# Patient Record
Sex: MALE | Race: WHITE | URBAN - NONMETROPOLITAN AREA
[De-identification: names, ages, dates, MRNs, and addresses within clinical notes are randomized per-mention and may not be internally consistent; named-entity substitution may affect disease eponyms.]

---

## 2024-01-23 ENCOUNTER — HOSPITAL ENCOUNTER (EMERGENCY)
Age: 44
Discharge: HOME | End: 2024-01-23

## 2024-01-23 ENCOUNTER — APPOINTMENT (OUTPATIENT)
Dept: GENERAL RADIOLOGY | Age: 44
End: 2024-01-23

## 2024-01-23 VITALS
BODY MASS INDEX: 36.8 KG/M2 | HEIGHT: 75 IN | TEMPERATURE: 97.8 F | OXYGEN SATURATION: 98 % | SYSTOLIC BLOOD PRESSURE: 139 MMHG | HEART RATE: 73 BPM | DIASTOLIC BLOOD PRESSURE: 93 MMHG | WEIGHT: 296 LBS | RESPIRATION RATE: 16 BRPM

## 2024-01-23 DIAGNOSIS — M25.561 RIGHT KNEE PAIN, UNSPECIFIED CHRONICITY: ICD-10-CM

## 2024-01-23 DIAGNOSIS — H92.03 OTALGIA OF BOTH EARS: Primary | ICD-10-CM

## 2024-01-23 PROCEDURE — 99283 EMERGENCY DEPT VISIT LOW MDM: CPT

## 2024-01-23 PROCEDURE — 73560 X-RAY EXAM OF KNEE 1 OR 2: CPT

## 2024-01-23 RX ORDER — PSEUDOEPHEDRINE HCL 30 MG
30 TABLET ORAL EVERY 6 HOURS PRN
Qty: 20 TABLET | Refills: 0 | Status: SHIPPED | OUTPATIENT
Start: 2024-01-23

## 2024-01-23 RX ORDER — NAPROXEN 500 MG/1
500 TABLET ORAL 2 TIMES DAILY WITH MEALS
Qty: 20 TABLET | Refills: 0 | Status: SHIPPED | OUTPATIENT
Start: 2024-01-23

## 2024-01-23 RX ORDER — FLUTICASONE PROPIONATE 50 MCG
1 SPRAY, SUSPENSION (ML) NASAL DAILY
Qty: 16 G | Refills: 0 | Status: SHIPPED | OUTPATIENT
Start: 2024-01-23

## 2024-01-23 ASSESSMENT — PAIN - FUNCTIONAL ASSESSMENT: PAIN_FUNCTIONAL_ASSESSMENT: 0-10

## 2024-01-23 ASSESSMENT — PAIN SCALES - GENERAL: PAINLEVEL_OUTOF10: 7

## 2024-01-23 ASSESSMENT — LIFESTYLE VARIABLES
HOW OFTEN DO YOU HAVE A DRINK CONTAINING ALCOHOL: NEVER
HOW MANY STANDARD DRINKS CONTAINING ALCOHOL DO YOU HAVE ON A TYPICAL DAY: PATIENT DOES NOT DRINK

## 2024-01-23 NOTE — ED PROVIDER NOTES
CC/HPI Summary, DDx, ED Course, and Reassessment:       Patient presented to the ER for evaluation of earache and knee pain.  States feels like his ears are clogged or swishing sensation on exam there is no cerumen impaction he has very mild cerumen that is on the sides of the ear canal wall.  He does have a small amount of middle ear fluid on the right.  No TM erythema or perforation.  Plan for treatment with Flonase and decongestant.  Second complaint is right knee pain for 4 days mainly to medial and posterior aspect of the knee pain with knee flexion and with bearing weight feels like is going to give out on him.  He is able to hold his leg out extended up off the bed no obvious joint instability on my exam has tenderness medially.  No obvious swelling.  No calf tenderness.  Neurovascular intact.  X-ray of right knee ordered.  He declines Tylenol or ibuprofen here.      Right knee x-rays interpreted by radiologist and reviewed by myself no fracture no dislocation no obvious joint effusion      Discussed results with patient.  He will be placed in a knee immobilizer.  Advise rest ice and elevate.  Naproxen prescribed as needed for pain.  Referral to orthopedics for follow-up.      I estimate there is LOW risk for COMPARTMENT SYNDROME, DEEP VENOUS THROMBOSIS, SEPTIC ARTHRITIS, TENDON OR NEUROVASCULAR INJURY, thus I consider the discharge disposition reasonable.      I am the Primary Clinician of Record.  FINAL IMPRESSION      1. Otalgia of both ears    2. Right knee pain, unspecified chronicity          DISPOSITION/PLAN     DISPOSITION Decision to Discharge    PATIENT REFERRED TO:  Your family doctor    In 1 week      Arnel Guzman DO  [address]  [address]  [facility] [id number] [phone]      [facility] orthopedics as needed if knee pain persists      DISCHARGE MEDICATIONS:  Discharge Medication List as of 1/23/2024  7:15 PM        START taking these medications    Details   fluticasone

## 2024-01-24 NOTE — ED NOTES
Pt discharge instructions, follow up and rx x 3 reviewed with pt. Pt verbalized understanding. No further needs. Pt discharged at this time.

## 2024-01-24 NOTE — DISCHARGE INSTRUCTIONS
Recommend follow-up with your primary care doctor.  Also referred to orthopedics as needed if needed pain persist.  Recommend ice rest and elevate.

## 2024-01-25 ENCOUNTER — HOSPITAL ENCOUNTER (EMERGENCY)
Age: 44
Discharge: HOME | End: 2024-01-25
Attending: EMERGENCY MEDICINE

## 2024-01-25 VITALS
RESPIRATION RATE: 18 BRPM | WEIGHT: 296 LBS | SYSTOLIC BLOOD PRESSURE: 123 MMHG | DIASTOLIC BLOOD PRESSURE: 73 MMHG | HEART RATE: 92 BPM | TEMPERATURE: 98.6 F | BODY MASS INDEX: 36.8 KG/M2 | OXYGEN SATURATION: 97 % | HEIGHT: 75 IN

## 2024-01-25 DIAGNOSIS — I83.899 RUPTURED VARICOSE VEIN: Primary | ICD-10-CM

## 2024-01-25 PROCEDURE — 99283 EMERGENCY DEPT VISIT LOW MDM: CPT

## 2024-01-25 ASSESSMENT — PAIN - FUNCTIONAL ASSESSMENT: PAIN_FUNCTIONAL_ASSESSMENT: 0-10

## 2024-01-25 ASSESSMENT — PAIN SCALES - GENERAL: PAINLEVEL_OUTOF10: 6

## 2024-01-26 NOTE — ED PROVIDER NOTES
follow-up return precautions.    - Patient was given    ED Medication Orders (From admission, onward)      None              - I discussed the results with patient/family including incidental findings.  - At this point I do not see indication for further work-up in the ER, as it is unlikely  and poses more risk than benefit.  - Follow up plan and return precautions also discussed.  Patient/family verbalized understanding of plan and agreeable to plan.        Clinical Impression:  1. Ruptured varicose vein          Disposition:  Discharge to home in good condition.    Blood pressure 123/73, pulse 92, temperature 98.6 °F (37 °C), temperature source Oral, resp. rate 18, height 1.905 m (6' 3\"), weight 134.3 kg (296 lb), SpO2 97 %.    Patient was given scripts for the following medications. I counseled patient how to take these medications.   New Prescriptions    No medications on file     Modified Medications    No medications on file       Disposition referral (if applicable):  [facility]  [address] [address]  [phone]  Schedule an appointment as soon as possible for a visit   As needed      IRaul, am the primary attending of record and contributed the majority of evaluation and treatment of emergent care for this encounter.     Total critical care time is 0 minutes, which excludes separately billable procedures and updating family. Time spent is specifically for management of the presenting complaint and symptoms initially, direct bedside care, reevaluation, review of records, and consultation.  There was a high probability of clinically significant life-threatening deterioration in the patient's condition, which required my urgent intervention.     This chart was generated in part by using Dragon Dictation system and may contain errors related to that system including errors in grammar, punctuation, and spelling, as well

## 2024-01-29 ENCOUNTER — HOSPITAL ENCOUNTER (EMERGENCY)
Age: 44
Discharge: HOME | End: 2024-01-30
Attending: STUDENT IN AN ORGANIZED HEALTH CARE EDUCATION/TRAINING PROGRAM

## 2024-01-29 ENCOUNTER — APPOINTMENT (OUTPATIENT)
Dept: CT IMAGING | Age: 44
End: 2024-01-29

## 2024-01-29 DIAGNOSIS — G43.809 OTHER MIGRAINE WITHOUT STATUS MIGRAINOSUS, NOT INTRACTABLE: Primary | ICD-10-CM

## 2024-01-29 LAB
ANION GAP SERPL CALCULATED.3IONS-SCNC: 8 MMOL/L (ref 3–16)
BASOPHILS # BLD: 0.1 K/UL (ref 0–0.2)
BASOPHILS NFR BLD: 0.8 %
BUN SERPL-MCNC: 9 MG/DL (ref 7–20)
CALCIUM SERPL-MCNC: 9.1 MG/DL (ref 8.3–10.6)
CHLORIDE SERPL-SCNC: 102 MMOL/L (ref 99–110)
CO2 SERPL-SCNC: 27 MMOL/L (ref 21–32)
CREAT SERPL-MCNC: 0.7 MG/DL (ref 0.9–1.3)
DEPRECATED RDW RBC AUTO: 14.8 % (ref 12.4–15.4)
EOSINOPHIL # BLD: 0.2 K/UL (ref 0–0.6)
EOSINOPHIL NFR BLD: 3 %
GFR SERPLBLD CREATININE-BSD FMLA CKD-EPI: >60 ML/MIN/{1.73_M2}
GLUCOSE SERPL-MCNC: 89 MG/DL (ref 70–99)
HCT VFR BLD AUTO: 45.5 % (ref 40.5–52.5)
HGB BLD-MCNC: 15.2 G/DL (ref 13.5–17.5)
LYMPHOCYTES # BLD: 2 K/UL (ref 1–5.1)
LYMPHOCYTES NFR BLD: 29.3 %
MCH RBC QN AUTO: 27 PG (ref 26–34)
MCHC RBC AUTO-ENTMCNC: 33.5 G/DL (ref 31–36)
MCV RBC AUTO: 80.8 FL (ref 80–100)
MONOCYTES # BLD: 0.5 K/UL (ref 0–1.3)
MONOCYTES NFR BLD: 7.8 %
NEUTROPHILS # BLD: 3.9 K/UL (ref 1.7–7.7)
NEUTROPHILS NFR BLD: 59.1 %
PLATELET # BLD AUTO: 172 K/UL (ref 135–450)
PMV BLD AUTO: 8.4 FL (ref 5–10.5)
POTASSIUM SERPL-SCNC: 3.9 MMOL/L (ref 3.5–5.1)
RBC # BLD AUTO: 5.63 M/UL (ref 4.2–5.9)
SODIUM SERPL-SCNC: 137 MMOL/L (ref 136–145)
WBC # BLD AUTO: 6.7 K/UL (ref 4–11)

## 2024-01-29 PROCEDURE — 36415 COLL VENOUS BLD VENIPUNCTURE: CPT

## 2024-01-29 PROCEDURE — 85025 COMPLETE CBC W/AUTO DIFF WBC: CPT

## 2024-01-29 PROCEDURE — 96375 TX/PRO/DX INJ NEW DRUG ADDON: CPT

## 2024-01-29 PROCEDURE — 6360000002 HC RX W HCPCS: Performed by: STUDENT IN AN ORGANIZED HEALTH CARE EDUCATION/TRAINING PROGRAM

## 2024-01-29 PROCEDURE — 80048 BASIC METABOLIC PNL TOTAL CA: CPT

## 2024-01-29 PROCEDURE — 2580000003 HC RX 258: Performed by: STUDENT IN AN ORGANIZED HEALTH CARE EDUCATION/TRAINING PROGRAM

## 2024-01-29 PROCEDURE — 96374 THER/PROPH/DIAG INJ IV PUSH: CPT

## 2024-01-29 PROCEDURE — 70450 CT HEAD/BRAIN W/O DYE: CPT

## 2024-01-29 PROCEDURE — 99284 EMERGENCY DEPT VISIT MOD MDM: CPT

## 2024-01-29 RX ORDER — DIPHENHYDRAMINE HYDROCHLORIDE 50 MG/ML
50 INJECTION INTRAMUSCULAR; INTRAVENOUS ONCE
Status: COMPLETED | OUTPATIENT
Start: 2024-01-29 | End: 2024-01-29

## 2024-01-29 RX ORDER — SODIUM CHLORIDE, SODIUM LACTATE, POTASSIUM CHLORIDE, AND CALCIUM CHLORIDE .6; .31; .03; .02 G/100ML; G/100ML; G/100ML; G/100ML
1000 INJECTION, SOLUTION INTRAVENOUS ONCE
Status: COMPLETED | OUTPATIENT
Start: 2024-01-29 | End: 2024-01-30

## 2024-01-29 RX ORDER — PROCHLORPERAZINE EDISYLATE 5 MG/ML
10 INJECTION INTRAMUSCULAR; INTRAVENOUS ONCE
Status: COMPLETED | OUTPATIENT
Start: 2024-01-29 | End: 2024-01-29

## 2024-01-29 RX ORDER — DEXAMETHASONE SODIUM PHOSPHATE 10 MG/ML
10 INJECTION, SOLUTION INTRAMUSCULAR; INTRAVENOUS ONCE
Status: COMPLETED | OUTPATIENT
Start: 2024-01-29 | End: 2024-01-29

## 2024-01-29 RX ORDER — KETOROLAC TROMETHAMINE 15 MG/ML
15 INJECTION, SOLUTION INTRAMUSCULAR; INTRAVENOUS ONCE
Status: COMPLETED | OUTPATIENT
Start: 2024-01-29 | End: 2024-01-29

## 2024-01-29 RX ADMIN — SODIUM CHLORIDE, POTASSIUM CHLORIDE, SODIUM LACTATE AND CALCIUM CHLORIDE 1000 ML: 600; 310; 30; 20 INJECTION, SOLUTION INTRAVENOUS at 23:46

## 2024-01-29 RX ADMIN — KETOROLAC TROMETHAMINE 15 MG: 15 INJECTION, SOLUTION INTRAMUSCULAR; INTRAVENOUS at 23:48

## 2024-01-29 RX ADMIN — DEXAMETHASONE SODIUM PHOSPHATE 10 MG: 10 INJECTION INTRAMUSCULAR; INTRAVENOUS at 23:51

## 2024-01-29 RX ADMIN — PROCHLORPERAZINE EDISYLATE 10 MG: 5 INJECTION INTRAMUSCULAR; INTRAVENOUS at 23:49

## 2024-01-29 RX ADMIN — DIPHENHYDRAMINE HYDROCHLORIDE 50 MG: 50 INJECTION INTRAMUSCULAR; INTRAVENOUS at 23:47

## 2024-01-29 ASSESSMENT — PAIN DESCRIPTION - LOCATION
LOCATION: HEAD
LOCATION: HEAD

## 2024-01-29 ASSESSMENT — PAIN SCALES - GENERAL
PAINLEVEL_OUTOF10: 9
PAINLEVEL_OUTOF10: 9

## 2024-01-29 ASSESSMENT — PAIN - FUNCTIONAL ASSESSMENT: PAIN_FUNCTIONAL_ASSESSMENT: 0-10

## 2024-01-30 VITALS
DIASTOLIC BLOOD PRESSURE: 83 MMHG | WEIGHT: 296 LBS | TEMPERATURE: 96.9 F | HEART RATE: 69 BPM | RESPIRATION RATE: 16 BRPM | SYSTOLIC BLOOD PRESSURE: 132 MMHG | OXYGEN SATURATION: 96 % | HEIGHT: 75 IN | BODY MASS INDEX: 36.8 KG/M2

## 2024-01-30 ASSESSMENT — PAIN DESCRIPTION - LOCATION: LOCATION: HEAD

## 2024-01-30 ASSESSMENT — PAIN SCALES - GENERAL: PAINLEVEL_OUTOF10: 6

## 2024-01-30 ASSESSMENT — PAIN - FUNCTIONAL ASSESSMENT: PAIN_FUNCTIONAL_ASSESSMENT: 0-10

## 2024-01-30 NOTE — ED PROVIDER NOTES
[facility] ED     EMERGENCY DEPARTMENT ENCOUNTER         Pt Name: Alberto Romero   MRN: [id number]   Birthdate 1980   Date of evaluation: 1/29/2024   Provider: Car West MD   PCP: No primary care provider on file.   Note Started: 11:04 PM EST 1/29/24       Chief Complaint     Headache (Migraine for 2 days. Took tylenol but no relief.)      History of Present Illness     Alberto Romero is a 43 y.o. male who presents with migraine syndrome for the past 2 days.  The patient has used Tylenol at home without relief.  The overall pattern is per baseline initiating at the occipital region and spreading to the bifrontal region.  The patient has previously required rescue in the emergency department.  He has no other major contributing past medical history and has otherwise been in baseline health no febrile illness no recent trauma.  He does note that he has a right upper scintillating scotoma which is a new feature of his migraine otherwise no other abnormalities when compared to prior.  Given the persistence of his pain he presents for evaluation.      I have reviewed the nursing notes and agree unless otherwise noted.    Review of Systems     Positives and pertinent negatives as per HPI.    Past Medical, Surgical, Family, and Social History     He has a past medical history of Migraine.  He has a past surgical history that includes Tonsillectomy; Cholecystectomy; Knee arthroscopy; and Spinal cord stimulator implant.  His family history is not on file.  He reports that he has never smoked. He has never used smokeless tobacco. He reports that he does not currently use alcohol. He reports that he does not currently use drugs.    SCREENINGS:          Arlette Coma Scale  Eye Opening: Spontaneous  Best Verbal Response: Oriented  Best Motor Response: Obeys commands  Arlette Coma Scale Score: 15                        CIWA Assessment  BP: (!) 157/90  Pulse: 87               Medications     Previous

## 2024-01-30 NOTE — DISCHARGE INSTRUCTIONS
You were evaluated in the emergency department for migraine headache. Assessments and testing completed during your visit were reassuring and at this time there is no indication for further testing, treatment or admission to the hospital. Given this it is appropriate to discharge you from the emergency department. At the time of discharge we discussed the following:    Please discuss this visit to the emergency department with your primary doctor for further recommendations.  Regarding the spots in your vision I do believe this was what is called a scintillating scotoma which can be a feature of migraine headaches.    Please note that sometimes it is difficult to diagnose a medical condition early in the disease process before the disease is fully manifest. Because of this, should you develop any new or worsening symptoms, you may return at any time to the emergency department for another evaluation. If available you are also recommended to review this visit with your primary care physician or other medical provider in the next 7 days. Thank you for allowing us to care for you today.

## 2024-02-14 ENCOUNTER — HOSPITAL ENCOUNTER (EMERGENCY)
Age: 44
Discharge: HOME | End: 2024-02-14

## 2024-02-14 ENCOUNTER — APPOINTMENT (OUTPATIENT)
Dept: GENERAL RADIOLOGY | Age: 44
End: 2024-02-14

## 2024-02-14 VITALS
SYSTOLIC BLOOD PRESSURE: 124 MMHG | WEIGHT: 306 LBS | HEART RATE: 69 BPM | BODY MASS INDEX: 38.05 KG/M2 | HEIGHT: 75 IN | DIASTOLIC BLOOD PRESSURE: 77 MMHG | OXYGEN SATURATION: 98 % | RESPIRATION RATE: 19 BRPM | TEMPERATURE: 97.6 F

## 2024-02-14 DIAGNOSIS — J01.90 ACUTE SINUSITIS, RECURRENCE NOT SPECIFIED, UNSPECIFIED LOCATION: Primary | ICD-10-CM

## 2024-02-14 DIAGNOSIS — J20.9 ACUTE BRONCHITIS, UNSPECIFIED ORGANISM: ICD-10-CM

## 2024-02-14 LAB
ALBUMIN SERPL-MCNC: 4.4 G/DL (ref 3.4–5)
ALBUMIN/GLOB SERPL: 1.8 {RATIO} (ref 1.1–2.2)
ALP SERPL-CCNC: 107 U/L (ref 40–129)
ALT SERPL-CCNC: 22 U/L (ref 10–40)
ANION GAP SERPL CALCULATED.3IONS-SCNC: 8 MMOL/L (ref 3–16)
AST SERPL-CCNC: 18 U/L (ref 15–37)
BASOPHILS # BLD: 0.1 K/UL (ref 0–0.2)
BASOPHILS NFR BLD: 1.4 %
BILIRUB SERPL-MCNC: 0.4 MG/DL (ref 0–1)
BUN SERPL-MCNC: 8 MG/DL (ref 7–20)
CALCIUM SERPL-MCNC: 8.3 MG/DL (ref 8.3–10.6)
CHLORIDE SERPL-SCNC: 105 MMOL/L (ref 99–110)
CO2 BLDV-SCNC: 26 MMOL/L
CO2 SERPL-SCNC: 27 MMOL/L (ref 21–32)
COHGB MFR BLDV: 1 % (ref 0–1.5)
CREAT SERPL-MCNC: 0.8 MG/DL (ref 0.9–1.3)
DEPRECATED RDW RBC AUTO: 14.6 % (ref 12.4–15.4)
EKG ATRIAL RATE: 74 BPM
EKG DIAGNOSIS: NORMAL
EKG P AXIS: -25 DEGREES
EKG P-R INTERVAL: 142 MS
EKG Q-T INTERVAL: 408 MS
EKG QRS DURATION: 86 MS
EKG QTC CALCULATION (BAZETT): 452 MS
EKG R AXIS: 5 DEGREES
EKG T AXIS: 31 DEGREES
EKG VENTRICULAR RATE: 74 BPM
EOSINOPHIL # BLD: 0.1 K/UL (ref 0–0.6)
EOSINOPHIL NFR BLD: 2.2 %
FLUAV RNA UPPER RESP QL NAA+PROBE: NEGATIVE
FLUBV AG NPH QL: NEGATIVE
GFR SERPLBLD CREATININE-BSD FMLA CKD-EPI: >60 ML/MIN/{1.73_M2}
GLUCOSE SERPL-MCNC: 123 MG/DL (ref 70–99)
HCO3 BLDV-SCNC: 24.2 MMOL/L (ref 23–29)
HCT VFR BLD AUTO: 45.8 % (ref 40.5–52.5)
HGB BLD-MCNC: 15.3 G/DL (ref 13.5–17.5)
LYMPHOCYTES # BLD: 1.2 K/UL (ref 1–5.1)
LYMPHOCYTES NFR BLD: 17.6 %
MCH RBC QN AUTO: 26.9 PG (ref 26–34)
MCHC RBC AUTO-ENTMCNC: 33.5 G/DL (ref 31–36)
METHGB MFR BLDV: 0.3 %
MONOCYTES # BLD: 0.5 K/UL (ref 0–1.3)
MONOCYTES NFR BLD: 7.9 %
NEUTROPHILS # BLD: 4.7 K/UL (ref 1.7–7.7)
NEUTROPHILS NFR BLD: 70.9 %
NT-PROBNP SERPL-MCNC: <36 PG/ML (ref 0–124)
O2 CT VFR BLDV CALC: 14 VOL %
O2 THERAPY: ABNORMAL
PCO2 BLDV: 47.7 MMHG (ref 40–50)
PH BLDV: 7.32 [PH] (ref 7.35–7.45)
PLATELET # BLD AUTO: 158 K/UL (ref 135–450)
PMV BLD AUTO: 8.3 FL (ref 5–10.5)
PO2 BLDV: 32.5 MMHG (ref 25–40)
POTASSIUM SERPL-SCNC: 3.9 MMOL/L (ref 3.5–5.1)
PROT SERPL-MCNC: 6.8 G/DL (ref 6.4–8.2)
RBC # BLD AUTO: 5.7 M/UL (ref 4.2–5.9)
SAO2 % BLDV: 57 %
SARS-COV-2 RDRP RESP QL NAA+PROBE: NOT DETECTED
SODIUM SERPL-SCNC: 140 MMOL/L (ref 136–145)
TROPONIN, HIGH SENSITIVITY: 7 NG/L (ref 0–22)
TROPONIN, HIGH SENSITIVITY: 8 NG/L (ref 0–22)
WBC # BLD AUTO: 6.6 K/UL (ref 4–11)

## 2024-02-14 PROCEDURE — 85025 COMPLETE CBC W/AUTO DIFF WBC: CPT

## 2024-02-14 PROCEDURE — 87635 SARS-COV-2 COVID-19 AMP PRB: CPT

## 2024-02-14 PROCEDURE — 71046 X-RAY EXAM CHEST 2 VIEWS: CPT

## 2024-02-14 PROCEDURE — 84484 ASSAY OF TROPONIN QUANT: CPT

## 2024-02-14 PROCEDURE — 93005 ELECTROCARDIOGRAM TRACING: CPT | Performed by: PHYSICIAN ASSISTANT

## 2024-02-14 PROCEDURE — 36415 COLL VENOUS BLD VENIPUNCTURE: CPT

## 2024-02-14 PROCEDURE — 82803 BLOOD GASES ANY COMBINATION: CPT

## 2024-02-14 PROCEDURE — 80053 COMPREHEN METABOLIC PANEL: CPT

## 2024-02-14 PROCEDURE — 83880 ASSAY OF NATRIURETIC PEPTIDE: CPT

## 2024-02-14 PROCEDURE — 6360000002 HC RX W HCPCS: Performed by: PHYSICIAN ASSISTANT

## 2024-02-14 PROCEDURE — 99285 EMERGENCY DEPT VISIT HI MDM: CPT

## 2024-02-14 PROCEDURE — 87804 INFLUENZA ASSAY W/OPTIC: CPT

## 2024-02-14 RX ORDER — AMOXICILLIN AND CLAVULANATE POTASSIUM 875; 125 MG/1; MG/1
1 TABLET, FILM COATED ORAL 2 TIMES DAILY
Qty: 20 TABLET | Refills: 0 | Status: SHIPPED | OUTPATIENT
Start: 2024-02-14 | End: 2024-02-24

## 2024-02-14 RX ORDER — PSEUDOEPHEDRINE HCL 120 MG/1
TABLET, FILM COATED, EXTENDED RELEASE ORAL
Qty: 5 TABLET | Refills: 0 | Status: SHIPPED | OUTPATIENT
Start: 2024-02-14

## 2024-02-14 RX ORDER — ALBUTEROL SULFATE 90 UG/1
1-2 AEROSOL, METERED RESPIRATORY (INHALATION) EVERY 6 HOURS PRN
Qty: 18 G | Refills: 0 | Status: SHIPPED | OUTPATIENT
Start: 2024-02-14

## 2024-02-14 RX ORDER — PREDNISONE 10 MG/1
20 TABLET ORAL DAILY
Qty: 10 TABLET | Refills: 0 | Status: SHIPPED | OUTPATIENT
Start: 2024-02-14 | End: 2024-02-19

## 2024-02-14 RX ORDER — ALBUTEROL SULFATE 2.5 MG/3ML
2.5 SOLUTION RESPIRATORY (INHALATION) ONCE
Status: COMPLETED | OUTPATIENT
Start: 2024-02-14 | End: 2024-02-14

## 2024-02-14 RX ADMIN — ALBUTEROL SULFATE 2.5 MG: 2.5 SOLUTION RESPIRATORY (INHALATION) at 11:48

## 2024-02-14 NOTE — DISCHARGE INSTRUCTIONS
X-ray was negative.  No sign of heart attack or heart failure.  No overwhelming infection in your body.  Breathing treatment did not significantly improve.  I do believe sinusitis and a mild bronchitis.  Medication sent to your pharmacy.  Discussed and you do not need a return to work note for tonight.

## 2024-02-14 NOTE — ED PROVIDER NOTES
[facility] ED  EMERGENCY DEPARTMENT ENCOUNTER        Pt Name: Alberto Romero  MRN: [id number]  Birthdate 1980  Date of evaluation: 2/14/2024  Provider: Simnoe Samaniego PA-C  PCP: No primary care provider on file.  Note Started: 9:49 AM EST 2/14/24      ROMINA. I have evaluated this patient.        CHIEF COMPLAINT       Chief Complaint   Patient presents with    Chest Pain     Patient reports chest pressure, SOB and cough for a couple of days. States was exposed to black mold.        HISTORY OF PRESENT ILLNESS: 1 or more Elements     History From: Patient    Alberto Romero is a 43 y.o. male who presents to the emergency department with his partner.  The patient reporting chest tightness, nasal congestion and headache.  Onset 2.5 days ago with some progression.  States yellow nasal discharge.  For the most part dry cough.  Cough more noted with deep breaths.  Chest discomfort with deep breath.  Tightness and sharp at times.  Not worse with movement.  He indicates no fevers or chills.  No gastrointestinal or urinary complaints.  He does relate black mold exposure while living in [facility] and with pneumonia he believes result of the black mold exposure occurring about 2 years ago or 2022.  Current residence he states also has black mold.  He is concerned.  His partner does not describe any respiratory complaints.  No obvious ill exposures.  His partner is asymptomatic.  Patient does not note any worse symptoms with exertion.  No steps in the house.  No history of CAD, DM, HLD, HTN or family history.    Patient's heart score is 1 based on age.  This assumes a nonelevated troponin.    Nursing Notes were all reviewed and agreed with or any disagreements were addressed in the HPI.    REVIEW OF SYSTEMS :      Review of Systems    Positives and Pertinent negatives as per HPI.     SURGICAL HISTORY     Past Surgical History:   Procedure Laterality Date    CHOLECYSTECTOMY      KNEE ARTHROSCOPY

## 2024-02-14 NOTE — ED NOTES
.Reviewed discharge instructions with patient. Patient verbalized understanding. No distress noted. Ambulatory from department.

## 2024-04-23 ENCOUNTER — APPOINTMENT (OUTPATIENT)
Dept: GENERAL RADIOLOGY | Age: 44
End: 2024-04-23
Payer: COMMERCIAL

## 2024-04-23 ENCOUNTER — HOSPITAL ENCOUNTER (EMERGENCY)
Age: 44
Discharge: HOME | End: 2024-04-23
Payer: COMMERCIAL

## 2024-04-23 VITALS
HEIGHT: 75 IN | HEART RATE: 74 BPM | RESPIRATION RATE: 18 BRPM | DIASTOLIC BLOOD PRESSURE: 71 MMHG | SYSTOLIC BLOOD PRESSURE: 112 MMHG | TEMPERATURE: 97.4 F | BODY MASS INDEX: 39.04 KG/M2 | OXYGEN SATURATION: 98 % | WEIGHT: 314 LBS

## 2024-04-23 DIAGNOSIS — M77.8 TENDINITIS OF RIGHT ELBOW: Primary | ICD-10-CM

## 2024-04-23 PROCEDURE — 99283 EMERGENCY DEPT VISIT LOW MDM: CPT

## 2024-04-23 PROCEDURE — 73080 X-RAY EXAM OF ELBOW: CPT

## 2024-04-23 ASSESSMENT — PAIN DESCRIPTION - ORIENTATION: ORIENTATION: RIGHT

## 2024-04-23 ASSESSMENT — PAIN DESCRIPTION - DESCRIPTORS: DESCRIPTORS: ACHING;PRESSURE

## 2024-04-23 ASSESSMENT — PAIN DESCRIPTION - PAIN TYPE: TYPE: ACUTE PAIN

## 2024-04-23 ASSESSMENT — PAIN DESCRIPTION - FREQUENCY: FREQUENCY: CONTINUOUS

## 2024-04-23 ASSESSMENT — PAIN SCALES - GENERAL: PAINLEVEL_OUTOF10: 6

## 2024-04-23 ASSESSMENT — PAIN DESCRIPTION - LOCATION: LOCATION: ELBOW

## 2024-04-23 ASSESSMENT — PAIN - FUNCTIONAL ASSESSMENT: PAIN_FUNCTIONAL_ASSESSMENT: 0-10

## 2024-04-24 NOTE — ED PROVIDER NOTES
[facility] ED  EMERGENCY DEPARTMENT ENCOUNTER        Pt Name: Alberto Romero  MRN: [id number]  Birthdate 1980  Date of evaluation: 4/23/2024  Provider: Estefany Bang PA-C  PCP: No primary care provider on file.  Note Started: 10:41 PM EDT 4/23/24      ROMINA. I have evaluated this patient.        CHIEF COMPLAINT       Chief Complaint   Patient presents with    Elbow Pain     Pt complains of right elbow pain, NKI. Pt states he has had elbow pain for 3 years, injured it then, never seen orthopedics. Pt states for 3 weeks the pain just isn't letting up. Mild joint swelling and warmth to right elbow.        HISTORY OF PRESENT ILLNESS: 1 or more Elements     History From: Patient            Chief Complaint: Right elbow pain    Alberto Romero is a 43 y.o. male who presents because for the past 3 weeks he has had pain to the right elbow.  He is a .  No injury.  He is right-hand dominant.  He thinks he has had this off and on in the past.  He has never seen a doctor.  He feels some tingling in his fingers at times.  No obvious swelling redness fever.    Nursing Notes were all reviewed and agreed with or any disagreements were addressed in the HPI.    REVIEW OF SYSTEMS :      Review of Systems    Positives and Pertinent negatives as per HPI.     SURGICAL HISTORY     Past Surgical History:   Procedure Laterality Date    CHOLECYSTECTOMY      KNEE ARTHROSCOPY      SPINAL CORD STIMULATOR IMPLANT      TONSILLECTOMY         CURRENTMEDICATIONS       Previous Medications    ALBUTEROL SULFATE HFA (PROVENTIL;VENTOLIN;PROAIR) 108 (90 BASE) MCG/ACT INHALER    Inhale 1-2 puffs into the lungs every 6 hours as needed for Wheezing    FLUTICASONE (FLONASE) 50 MCG/ACT NASAL SPRAY    1 spray by Each Nostril route daily    PSEUDOEPHEDRINE (SUDAFED 12 HOUR) 120 MG EXTENDED RELEASE TABLET    Take 1 tablet at start of your day.       ALLERGIES     Morphine    FAMILYHISTORY     History reviewed. No pertinent family

## 2024-06-14 ENCOUNTER — HOSPITAL ENCOUNTER (EMERGENCY)
Age: 44
Discharge: HOME | End: 2024-06-14
Attending: EMERGENCY MEDICINE
Payer: COMMERCIAL

## 2024-06-14 ENCOUNTER — APPOINTMENT (OUTPATIENT)
Dept: GENERAL RADIOLOGY | Age: 44
End: 2024-06-14
Payer: COMMERCIAL

## 2024-06-14 VITALS
HEART RATE: 105 BPM | SYSTOLIC BLOOD PRESSURE: 148 MMHG | OXYGEN SATURATION: 99 % | TEMPERATURE: 99.1 F | RESPIRATION RATE: 16 BRPM | DIASTOLIC BLOOD PRESSURE: 98 MMHG

## 2024-06-14 DIAGNOSIS — J18.9 PNEUMONIA DUE TO INFECTIOUS ORGANISM, UNSPECIFIED LATERALITY, UNSPECIFIED PART OF LUNG: Primary | ICD-10-CM

## 2024-06-14 LAB
ANION GAP SERPL CALCULATED.3IONS-SCNC: 13 MMOL/L (ref 3–16)
BASOPHILS # BLD: 0 K/UL (ref 0–0.2)
BASOPHILS NFR BLD: 0.3 %
BUN SERPL-MCNC: 9 MG/DL (ref 7–20)
CALCIUM SERPL-MCNC: 8.9 MG/DL (ref 8.3–10.6)
CHLORIDE SERPL-SCNC: 95 MMOL/L (ref 99–110)
CO2 SERPL-SCNC: 28 MMOL/L (ref 21–32)
CREAT SERPL-MCNC: 0.8 MG/DL (ref 0.9–1.3)
D-DIMER QUANTITATIVE: <0.27 UG/ML FEU (ref 0–0.6)
DEPRECATED RDW RBC AUTO: 14.3 % (ref 12.4–15.4)
EKG ATRIAL RATE: 105 BPM
EKG DIAGNOSIS: NORMAL
EKG P AXIS: 14 DEGREES
EKG P-R INTERVAL: 126 MS
EKG Q-T INTERVAL: 346 MS
EKG QRS DURATION: 94 MS
EKG QTC CALCULATION (BAZETT): 457 MS
EKG R AXIS: -15 DEGREES
EKG T AXIS: 31 DEGREES
EKG VENTRICULAR RATE: 105 BPM
EOSINOPHIL # BLD: 0.2 K/UL (ref 0–0.6)
EOSINOPHIL NFR BLD: 1.6 %
GFR SERPLBLD CREATININE-BSD FMLA CKD-EPI: >90 ML/MIN/{1.73_M2}
GLUCOSE SERPL-MCNC: 97 MG/DL (ref 70–99)
HCT VFR BLD AUTO: 48.5 % (ref 40.5–52.5)
HGB BLD-MCNC: 15.8 G/DL (ref 13.5–17.5)
LYMPHOCYTES # BLD: 1.3 K/UL (ref 1–5.1)
LYMPHOCYTES NFR BLD: 12.7 %
MCH RBC QN AUTO: 26.4 PG (ref 26–34)
MCHC RBC AUTO-ENTMCNC: 32.5 G/DL (ref 31–36)
MCV RBC AUTO: 81.3 FL (ref 80–100)
MONOCYTES # BLD: 0.5 K/UL (ref 0–1.3)
MONOCYTES NFR BLD: 5.2 %
NEUTROPHILS NFR BLD: 80.2 %
PLATELET # BLD AUTO: 169 K/UL (ref 135–450)
PMV BLD AUTO: 8.9 FL (ref 5–10.5)
POTASSIUM SERPL-SCNC: 3.9 MMOL/L (ref 3.5–5.1)
RBC # BLD AUTO: 5.97 M/UL (ref 4.2–5.9)
S PYO AG THROAT QL: NEGATIVE
SODIUM SERPL-SCNC: 136 MMOL/L (ref 136–145)
TROPONIN, HIGH SENSITIVITY: 8 NG/L (ref 0–22)
TROPONIN, HIGH SENSITIVITY: 8 NG/L (ref 0–22)
WBC # BLD AUTO: 10.1 K/UL (ref 4–11)

## 2024-06-14 PROCEDURE — 87880 STREP A ASSAY W/OPTIC: CPT

## 2024-06-14 PROCEDURE — 99285 EMERGENCY DEPT VISIT HI MDM: CPT

## 2024-06-14 PROCEDURE — 93005 ELECTROCARDIOGRAM TRACING: CPT | Performed by: EMERGENCY MEDICINE

## 2024-06-14 PROCEDURE — 80048 BASIC METABOLIC PNL TOTAL CA: CPT

## 2024-06-14 PROCEDURE — 84484 ASSAY OF TROPONIN QUANT: CPT

## 2024-06-14 PROCEDURE — 93010 ELECTROCARDIOGRAM REPORT: CPT | Performed by: INTERNAL MEDICINE

## 2024-06-14 PROCEDURE — 71046 X-RAY EXAM CHEST 2 VIEWS: CPT

## 2024-06-14 PROCEDURE — 6370000000 HC RX 637 (ALT 250 FOR IP): Performed by: EMERGENCY MEDICINE

## 2024-06-14 PROCEDURE — 85025 COMPLETE CBC W/AUTO DIFF WBC: CPT

## 2024-06-14 PROCEDURE — 85379 FIBRIN DEGRADATION QUANT: CPT

## 2024-06-14 PROCEDURE — 87081 CULTURE SCREEN ONLY: CPT

## 2024-06-14 RX ORDER — AMOXICILLIN AND CLAVULANATE POTASSIUM 875; 125 MG/1; MG/1
1 TABLET, FILM COATED ORAL ONCE
Status: COMPLETED | OUTPATIENT
Start: 2024-06-14 | End: 2024-06-14

## 2024-06-14 RX ORDER — AMOXICILLIN AND CLAVULANATE POTASSIUM 875; 125 MG/1; MG/1
1 TABLET, FILM COATED ORAL 2 TIMES DAILY
Qty: 14 TABLET | Refills: 0 | Status: SHIPPED | OUTPATIENT
Start: 2024-06-14 | End: 2024-06-21

## 2024-06-14 RX ADMIN — AMOXICILLIN AND CLAVULANATE POTASSIUM 1 TABLET: 875; 125 TABLET, FILM COATED ORAL at 03:55

## 2024-06-14 ASSESSMENT — PAIN SCALES - GENERAL: PAINLEVEL_OUTOF10: 7

## 2024-06-14 ASSESSMENT — PAIN DESCRIPTION - LOCATION: LOCATION: THROAT

## 2024-06-14 ASSESSMENT — PAIN DESCRIPTION - PAIN TYPE: TYPE: ACUTE PAIN

## 2024-06-14 ASSESSMENT — PAIN DESCRIPTION - DESCRIPTORS: DESCRIPTORS: ACHING

## 2024-06-14 ASSESSMENT — PAIN - FUNCTIONAL ASSESSMENT: PAIN_FUNCTIONAL_ASSESSMENT: 0-10

## 2024-06-14 NOTE — ED PROVIDER NOTES
[facility]  ED  EMERGENCY DEPARTMENT ENCOUNTER        Patient Name: Alberto Romero  MRN: [id number]  Birthdate 1980  Date of evaluation: 6/14/2024  Provider: Luca Mike MD  PCP: No primary care provider on file.  Note Started: 2:00 AM EDT 6/14/24    CHIEF COMPLAINT       Pharyngitis and Chest Pain (Pt states throat pain, coughing and chest pain today. Denies any cardiac history. Pt states I think its strep )      HISTORY OF PRESENT ILLNESS: 1 or more Elements     History from : Patient    Limitations to history : None    Alberto Romero is a 43 y.o. male who presents for evaluation of sore throat, chest pain, cough.  Patient states that he has had coughing, chest discomfort throughout the day.  He describes that he felt some palpitations in his chest.  He denies any cardiac history.  He is concerned about strep throat.  He denies any new leg swelling.  No fevers.  No productive cough.    Nursing Notes were all reviewed and agreed with or any disagreements were addressed in the HPI.    REVIEW OF SYSTEMS :      Review of Systems    Positives and Pertinent negatives as per HPI.     SURGICAL HISTORY     Past Surgical History:   Procedure Laterality Date    CHOLECYSTECTOMY      KNEE ARTHROSCOPY      SPINAL CORD STIMULATOR IMPLANT      TONSILLECTOMY         CURRENTMEDICATIONS       Previous Medications    No medications on file       ALLERGIES     Morphine    FAMILYHISTORY     History reviewed. No pertinent family history.     SOCIAL HISTORY       Social History     Tobacco Use    Smoking status: Never    Smokeless tobacco: Never   Substance Use Topics    Alcohol use: Not Currently    Drug use: Not Currently       SCREENINGS                         [facility] Assessment  BP: (!) 148/98  Pulse: (!) 105           PHYSICAL EXAM  1 or more Elements     ED Triage Vitals   BP Temp Temp Source Pulse Respirations SpO2 Height Weight   06/14/24 0140 06/14/24 0139 06/14/24 0139 06/14/24 0139 06/14/24 0139 06/14/24

## 2024-06-14 NOTE — DISCHARGE INSTRUCTIONS
As discussed, your blood work overall looks good for your heart.  You have been started on antibiotics to cover for the possibility pneumonia because of your symptoms.  Please take the antibiotics for the entire course.  Please schedule follow-up with a primary care doctor.  You have been referred to the family medicine located at this hospital.

## 2024-06-16 LAB — S PYO THROAT QL CULT: NORMAL

## 2024-08-25 ENCOUNTER — OFFICE VISIT (OUTPATIENT)
Age: 44
End: 2024-08-25

## 2024-08-25 VITALS
DIASTOLIC BLOOD PRESSURE: 88 MMHG | HEART RATE: 105 BPM | TEMPERATURE: 97.7 F | OXYGEN SATURATION: 98 % | SYSTOLIC BLOOD PRESSURE: 132 MMHG

## 2024-08-25 DIAGNOSIS — R50.9 FEVER, UNSPECIFIED FEVER CAUSE: ICD-10-CM

## 2024-08-25 DIAGNOSIS — U07.1 COVID-19: Primary | ICD-10-CM

## 2024-08-25 DIAGNOSIS — J02.9 SORE THROAT: ICD-10-CM

## 2024-08-25 DIAGNOSIS — R05.1 ACUTE COUGH: ICD-10-CM

## 2024-08-25 LAB
Lab: ABNORMAL
QC PASS/FAIL: ABNORMAL
SARS-COV-2 RDRP RESP QL NAA+PROBE: POSITIVE

## 2024-08-25 NOTE — PATIENT INSTRUCTIONS
COVID-19  Your in clinic Covid-19 test was positive  Treat symptoms with OTC remedies  Wear a mask in public  Avoid contact with people until fever free for 24 hours  Follow CDC guidelines regarding isolation  Recommend OTC treatment for symptoms:  ibuprofen (Advil, Motrin) and acetaminophen (Tylenol) for fevers and pain relief.  Antihistamines (Claritin, Zyrtec, Allegra, or Xyzal) and nasal steroid sprays (such as Flonase) to help with nasal congestion and runny nose.  If you have high blood pressure avoid nasal decongestants such as pseudoephedrine as they can elevate your blood pressure  Saline Mist Sprays such as Arm & Hammer Simply Saline to loosen and clear secretions  throat sprays (Cepacol, chloraseptic) for throat pain.  throat lozenges, and increased water intake for cough.  honey (1-2 teaspoons every hour) for relief of throat irritation and coughing fits.  warm teas, humidifiers, nasal lavages, and sleeping in an inclined position are also helpful options that can lessen symptoms.

## 2024-08-25 NOTE — PROGRESS NOTES
Alberto Romero (: 1980) is a 43 y.o. male, Established patient, here for evaluation of the following chief complaint(s):  Otalgia (Bilateral ear pain/Symptoms started yesterday morning), Fever (Pt states took tylenol about 1.5 hour ago but states he had fever of 101.1 around 4-5 hours prior), Headache, Pharyngitis (throat feels raw pt states), and Cough      ASSESSMENT/PLAN:    ICD-10-CM    1. COVID-19  U07.1       2. Acute cough  R05.1 (ID Now) POCT COVID-19 Rapid, NAAT      3. Fever, unspecified fever cause  R50.9 (ID Now) POCT COVID-19 Rapid, NAAT      4. Sore throat  J02.9 (ID Now) POCT COVID-19 Rapid, NAAT          COVID:  In-clinic COVID test POSITIVE  Low concern for pneumonia, strep pharyngitis, bronchitis, or respiratory distress at this time.  Provided currently recommended CDC guidelines regarding quarantine  Discussed Paxlovid antiviral treatment, patient declined  Recommended OTC medications and home remedy treatments for symptomatic relief  Discussed strict ED follow up guidelines should symptoms worsen.  Discussed risks associated with taking OTC medications such as Sudafed as they can raise blood pressure    Discussed PCP follow up for persisting or worsening symptoms, or to return to the clinic if unable to obtain PCP follow up for worsening symptoms.    The patient tolerated their visit well. The patient and/or the family were informed of the results of any tests, a time was given to answer questions, a plan was proposed and they agreed with plan. Reviewed AVS with treatment instructions and answered questions - pt/family expresses understanding and agreement with the discussed treatment plan and AVS instructions.      SUBJECTIVE/OBJECTIVE:  HPI:   43 y.o. male presents for complaint of sore throat, headache, coughing, ear ache,fever, body aches,  x 1 days.    Admits works with public  Denies Any known ill contacts    Tylenol makes symptoms better.  Nothing makes symptoms worse.    Has

## 2024-09-21 ENCOUNTER — APPOINTMENT (OUTPATIENT)
Dept: GENERAL RADIOLOGY | Age: 44
End: 2024-09-21
Payer: COMMERCIAL

## 2024-09-21 ENCOUNTER — HOSPITAL ENCOUNTER (EMERGENCY)
Age: 44
Discharge: HOME | End: 2024-09-21
Attending: EMERGENCY MEDICINE
Payer: COMMERCIAL

## 2024-09-21 VITALS
WEIGHT: 310 LBS | SYSTOLIC BLOOD PRESSURE: 135 MMHG | RESPIRATION RATE: 16 BRPM | HEIGHT: 75 IN | HEART RATE: 67 BPM | TEMPERATURE: 97.8 F | DIASTOLIC BLOOD PRESSURE: 78 MMHG | OXYGEN SATURATION: 95 % | BODY MASS INDEX: 38.54 KG/M2

## 2024-09-21 DIAGNOSIS — S93.401A SPRAIN OF RIGHT ANKLE, UNSPECIFIED LIGAMENT, INITIAL ENCOUNTER: Primary | ICD-10-CM

## 2024-09-21 PROCEDURE — 99283 EMERGENCY DEPT VISIT LOW MDM: CPT

## 2024-09-21 PROCEDURE — 73610 X-RAY EXAM OF ANKLE: CPT

## 2024-09-21 PROCEDURE — 6370000000 HC RX 637 (ALT 250 FOR IP): Performed by: EMERGENCY MEDICINE

## 2024-09-21 RX ORDER — IBUPROFEN 800 MG/1
800 TABLET, FILM COATED ORAL ONCE
Status: COMPLETED | OUTPATIENT
Start: 2024-09-21 | End: 2024-09-21

## 2024-09-21 RX ADMIN — IBUPROFEN 800 MG: 800 TABLET, FILM COATED ORAL at 06:24

## 2024-09-21 ASSESSMENT — PAIN - FUNCTIONAL ASSESSMENT
PAIN_FUNCTIONAL_ASSESSMENT: 0-10
PAIN_FUNCTIONAL_ASSESSMENT: 0-10

## 2024-09-21 ASSESSMENT — PAIN DESCRIPTION - LOCATION
LOCATION: ANKLE
LOCATION: LEG

## 2024-09-21 ASSESSMENT — PAIN SCALES - GENERAL
PAINLEVEL_OUTOF10: 7

## 2024-09-21 ASSESSMENT — PAIN DESCRIPTION - ORIENTATION
ORIENTATION: RIGHT;LOWER
ORIENTATION: RIGHT

## 2024-09-21 ASSESSMENT — PAIN DESCRIPTION - PAIN TYPE: TYPE: ACUTE PAIN

## 2024-10-31 ENCOUNTER — APPOINTMENT (OUTPATIENT)
Dept: GENERAL RADIOLOGY | Age: 44
End: 2024-10-31
Payer: COMMERCIAL

## 2024-10-31 ENCOUNTER — HOSPITAL ENCOUNTER (EMERGENCY)
Age: 44
Discharge: HOME | End: 2024-11-01
Payer: COMMERCIAL

## 2024-10-31 DIAGNOSIS — E66.813 CLASS 3 SEVERE OBESITY WITH BODY MASS INDEX (BMI) OF 40.0 TO 44.9 IN ADULT, UNSPECIFIED OBESITY TYPE, UNSPECIFIED WHETHER SERIOUS COMORBIDITY PRESENT: ICD-10-CM

## 2024-10-31 DIAGNOSIS — E66.01 CLASS 3 SEVERE OBESITY WITH BODY MASS INDEX (BMI) OF 40.0 TO 44.9 IN ADULT, UNSPECIFIED OBESITY TYPE, UNSPECIFIED WHETHER SERIOUS COMORBIDITY PRESENT: ICD-10-CM

## 2024-10-31 DIAGNOSIS — R06.02 SOB (SHORTNESS OF BREATH): ICD-10-CM

## 2024-10-31 DIAGNOSIS — R60.0 BILATERAL LOWER EXTREMITY EDEMA: Primary | ICD-10-CM

## 2024-10-31 LAB
ALBUMIN SERPL-MCNC: 4.6 G/DL (ref 3.4–5)
ALBUMIN/GLOB SERPL: 1.8 {RATIO} (ref 1.1–2.2)
ALP SERPL-CCNC: 85 U/L (ref 40–129)
ALT SERPL-CCNC: 38 U/L (ref 10–40)
ANION GAP SERPL CALCULATED.3IONS-SCNC: 11 MMOL/L (ref 3–16)
AST SERPL-CCNC: 35 U/L (ref 15–37)
BASOPHILS # BLD: 0 K/UL (ref 0–0.2)
BASOPHILS NFR BLD: 0.6 %
BILIRUB SERPL-MCNC: 0.6 MG/DL (ref 0–1)
BUN SERPL-MCNC: 14 MG/DL (ref 7–20)
CALCIUM SERPL-MCNC: 9.2 MG/DL (ref 8.3–10.6)
CHLORIDE SERPL-SCNC: 104 MMOL/L (ref 99–110)
CO2 SERPL-SCNC: 24 MMOL/L (ref 21–32)
CREAT SERPL-MCNC: 0.8 MG/DL (ref 0.9–1.3)
DEPRECATED RDW RBC AUTO: 15.5 % (ref 12.4–15.4)
EOSINOPHIL # BLD: 0.1 K/UL (ref 0–0.6)
EOSINOPHIL NFR BLD: 2 %
GFR SERPLBLD CREATININE-BSD FMLA CKD-EPI: >90 ML/MIN/{1.73_M2}
GLUCOSE SERPL-MCNC: 97 MG/DL (ref 70–99)
HCT VFR BLD AUTO: 45.5 % (ref 40.5–52.5)
HGB BLD-MCNC: 15.1 G/DL (ref 13.5–17.5)
LYMPHOCYTES # BLD: 2 K/UL (ref 1–5.1)
LYMPHOCYTES NFR BLD: 27.5 %
MCH RBC QN AUTO: 26.6 PG (ref 26–34)
MCHC RBC AUTO-ENTMCNC: 33.2 G/DL (ref 31–36)
MCV RBC AUTO: 80.3 FL (ref 80–100)
MONOCYTES # BLD: 0.5 K/UL (ref 0–1.3)
MONOCYTES NFR BLD: 6.8 %
NEUTROPHILS # BLD: 4.5 K/UL (ref 1.7–7.7)
NEUTROPHILS NFR BLD: 63.1 %
NT-PROBNP SERPL-MCNC: <36 PG/ML (ref 0–124)
PLATELET # BLD AUTO: 169 K/UL (ref 135–450)
PMV BLD AUTO: 8.5 FL (ref 5–10.5)
POTASSIUM SERPL-SCNC: 3.9 MMOL/L (ref 3.5–5.1)
PROT SERPL-MCNC: 7.1 G/DL (ref 6.4–8.2)
RBC # BLD AUTO: 5.66 M/UL (ref 4.2–5.9)
SODIUM SERPL-SCNC: 139 MMOL/L (ref 136–145)
TROPONIN, HIGH SENSITIVITY: 8 NG/L (ref 0–22)
WBC # BLD AUTO: 7.1 K/UL (ref 4–11)

## 2024-10-31 PROCEDURE — 93005 ELECTROCARDIOGRAM TRACING: CPT | Performed by: PHYSICIAN ASSISTANT

## 2024-10-31 PROCEDURE — 80053 COMPREHEN METABOLIC PANEL: CPT

## 2024-10-31 PROCEDURE — 85025 COMPLETE CBC W/AUTO DIFF WBC: CPT

## 2024-10-31 PROCEDURE — 99285 EMERGENCY DEPT VISIT HI MDM: CPT

## 2024-10-31 PROCEDURE — 71045 X-RAY EXAM CHEST 1 VIEW: CPT

## 2024-10-31 PROCEDURE — 84484 ASSAY OF TROPONIN QUANT: CPT

## 2024-10-31 PROCEDURE — 83880 ASSAY OF NATRIURETIC PEPTIDE: CPT

## 2024-10-31 ASSESSMENT — PAIN SCALES - GENERAL: PAINLEVEL_OUTOF10: 5

## 2024-10-31 ASSESSMENT — LIFESTYLE VARIABLES
HOW MANY STANDARD DRINKS CONTAINING ALCOHOL DO YOU HAVE ON A TYPICAL DAY: PATIENT DOES NOT DRINK
HOW OFTEN DO YOU HAVE A DRINK CONTAINING ALCOHOL: NEVER

## 2024-11-01 VITALS
TEMPERATURE: 98.4 F | SYSTOLIC BLOOD PRESSURE: 136 MMHG | RESPIRATION RATE: 16 BRPM | WEIGHT: 315 LBS | HEIGHT: 75 IN | DIASTOLIC BLOOD PRESSURE: 84 MMHG | OXYGEN SATURATION: 98 % | HEART RATE: 74 BPM | BODY MASS INDEX: 39.17 KG/M2

## 2024-11-01 LAB
EKG ATRIAL RATE: 85 BPM
EKG DIAGNOSIS: NORMAL
EKG P AXIS: 30 DEGREES
EKG P-R INTERVAL: 154 MS
EKG Q-T INTERVAL: 376 MS
EKG QRS DURATION: 98 MS
EKG QTC CALCULATION (BAZETT): 447 MS
EKG R AXIS: -3 DEGREES
EKG T AXIS: 6 DEGREES
EKG VENTRICULAR RATE: 85 BPM

## 2024-11-01 PROCEDURE — 93010 ELECTROCARDIOGRAM REPORT: CPT | Performed by: INTERNAL MEDICINE

## 2024-11-01 NOTE — ED PROVIDER NOTES
[facility]  ED  EMERGENCY DEPARTMENT ENCOUNTER        Pt Name: Alberto Romero  MRN: [id number]  Birthdate 1980  Date of evaluation: 10/31/2024  Provider: MARINA SANCHEZ PA-C  PCP: No primary care provider on file.  ED Attending: MD Danielle      ROMINA. I have evaluated this patient.      CHIEF COMPLAINT:     Chief Complaint   Patient presents with    Leg Swelling     Pt reports leg swelling for the past 4 days. Pt also states he short of breath all the time       HISTORY OF PRESENT ILLNESS:      History provided by the patient. No limitations.    Alberto Romero is a 43 y.o. male who arrives to the ED by private vehicle.  Patient is here complaining of bilateral lower extremity swelling for the last 4 days.  He also reports feeling short of breath.  He reports the swelling seems to be getting gradually worse and has close his lower legs to be a little painful.  Shortness of breath is constant.  He notices it at rest and does not notice any exacerbated symptoms with activity or when laying flat.  He denies associated chest pain, coughing.  He denies fevers or chills.  He denies past medical history, denies taking any medication.  He states he called his PCP in an attempt to get an appointment but he could not get an appointment until the second week of December.  Therefore, due to worsening symptoms, he is here.    Nursing Notes were reviewed     REVIEW OF SYSTEMS:     Review of Systems  Positives and pertinent negatives as per HPI.      PAST MEDICAL HISTORY:     Past Medical History:   Diagnosis Date    Migraine        SURGICAL HISTORY:      Past Surgical History:   Procedure Laterality Date    CHOLECYSTECTOMY      KNEE ARTHROSCOPY      SPINAL CORD STIMULATOR IMPLANT      TONSILLECTOMY         CURRENT MEDICATIONS:     There are no discharge medications for this patient.      ALLERGIES:    Morphine    FAMILY HISTORY:     @FAMHX    SOCIAL HISTORY:       Social History     Socioeconomic History     aware of the results of the workup.  I told him about the initial concerns I had and what my differential was.  In the end, I recommended he elevate his legs at rest, wear compression stockings, decrease sodium in diet, lose weight.  He will be provided with a referral to our residency clinic as he was unable to get into his doctor for about 6 weeks.    Exclusion criteria - the patient is NOT to be included for SEP-1 Core Measure due to:  Infection is not suspected     Consults/discussion with other professionals: None  Social determinants: None  Chronic conditions:   Past Medical History:   Diagnosis Date    Migraine      Records reviewed: None    Disposition considerations/Plan (include 1 test not done- why not, d/c vs admit): Patient reports bilateral lower extremity swelling, mild pain and dyspnea.  No chest pain.  No coughing.  Vital signs normal.  Cardiopulmonary workup is reassuring.  Chest x-ray shows no consolidation, no edema.  Labs are all normal including normal kidney function, negative troponin, negative BNP.  I recommend close primary care follow-up.  Referral provided.  Recommended the patient return should he experience any chest discomfort, worsening dyspnea or other concerns.  Employed shared decision making.     FINAL IMPRESSION:      1. Bilateral lower extremity edema    2. SOB (shortness of breath)    3. Class 3 severe obesity with body mass index (BMI) of 40.0 to 44.9 in adult, unspecified obesity type, unspecified whether serious comorbidity present          DISPOSITION/PLAN:   DISPOSITION Decision To Discharge      PATIENT REFERRED TO:  [facility]  [address] [address]  [phone]  Schedule an appointment as soon as possible for a visit         DISCHARGE MEDICATIONS:  There are no discharge medications for this patient.                 (Please note thatportions of this note were completed with a voice recognition program.

## 2024-12-08 ENCOUNTER — APPOINTMENT (OUTPATIENT)
Dept: GENERAL RADIOLOGY | Age: 44
End: 2024-12-08
Payer: COMMERCIAL

## 2024-12-08 ENCOUNTER — HOSPITAL ENCOUNTER (EMERGENCY)
Age: 44
Discharge: HOME | End: 2024-12-08
Payer: COMMERCIAL

## 2024-12-08 VITALS
HEART RATE: 96 BPM | TEMPERATURE: 98.1 F | HEIGHT: 75 IN | DIASTOLIC BLOOD PRESSURE: 85 MMHG | RESPIRATION RATE: 18 BRPM | WEIGHT: 315 LBS | BODY MASS INDEX: 39.17 KG/M2 | SYSTOLIC BLOOD PRESSURE: 126 MMHG | OXYGEN SATURATION: 95 %

## 2024-12-08 DIAGNOSIS — S93.401A SPRAIN OF RIGHT ANKLE, UNSPECIFIED LIGAMENT, INITIAL ENCOUNTER: Primary | ICD-10-CM

## 2024-12-08 PROCEDURE — 73610 X-RAY EXAM OF ANKLE: CPT

## 2024-12-08 PROCEDURE — 99283 EMERGENCY DEPT VISIT LOW MDM: CPT

## 2024-12-08 ASSESSMENT — PAIN DESCRIPTION - DESCRIPTORS: DESCRIPTORS: ACHING

## 2024-12-08 ASSESSMENT — PAIN DESCRIPTION - LOCATION: LOCATION: ANKLE

## 2024-12-08 ASSESSMENT — PAIN SCALES - GENERAL: PAINLEVEL_OUTOF10: 7

## 2024-12-08 ASSESSMENT — PAIN DESCRIPTION - ORIENTATION: ORIENTATION: RIGHT

## 2024-12-08 ASSESSMENT — PAIN - FUNCTIONAL ASSESSMENT: PAIN_FUNCTIONAL_ASSESSMENT: 0-10

## 2024-12-09 NOTE — DISCHARGE INSTRUCTIONS
If you want to see improvement in your right ankle pain and swelling, I recommend staying off of it is much as possible. Elevate and ice your ankle at rest.  Use the crutches that you have.

## 2024-12-09 NOTE — ED PROVIDER NOTES
[facility]  ED  eMERGENCY dEPARTMENT eNCOUnter        Pt Name: Alberto Romero  MRN: [id number]  Birthdate 1980  Date of evaluation: 12/8/2024  Provider: MARINA SANCHEZ PA-C  PCP: No primary care provider on file.  ED Attending: MD Haleigh    ROMINA patient. This patient was not seen by the ED attending, though they were available to consult.  History is provided by the patient. No limitations.     CHIEF COMPLAINT:  Ankle Pain (States he felt something pop two days ago when walking. 7/10 pain )      HISTORY OF PRESENT ILLNESS:  Alberto Romero is a 43 y.o. male who presents to the ED via private vehicle with complaints of right ankle injury.  Patient states yesterday he was walking, turned his ankle, felt a pop.  Since then he has had pain and swelling.  He is continuing to bear weight and go to work.  He is not really rested his ankle.  Though he has crutches at home, he is not using them.  He rates his pain 7/10.  He is here in the ED tonight at 11 PM because he wants us to check his ankle to be sure there is not a fracture.  No other complaints, modifying factors or associated symptoms.     Nursing notes reviewed.   Past Medical History:   Diagnosis Date    Migraine      Past Surgical History:   Procedure Laterality Date    CHOLECYSTECTOMY      KNEE ARTHROSCOPY      SPINAL CORD STIMULATOR IMPLANT      TONSILLECTOMY       History reviewed. No pertinent family history.  Social History     Socioeconomic History    Marital status: Single     Spouse name: Not on file    Number of children: Not on file    Years of education: Not on file    Highest education level: Not on file   Occupational History    Not on file   Tobacco Use    Smoking status: Never    Smokeless tobacco: Never   Substance and Sexual Activity    Alcohol use: Not Currently    Drug use: Not Currently    Sexual activity: Not on file   Other Topics Concern    Not on file   Social History Narrative    Not on file     Social Determinants of